# Patient Record
Sex: FEMALE | URBAN - METROPOLITAN AREA
[De-identification: names, ages, dates, MRNs, and addresses within clinical notes are randomized per-mention and may not be internally consistent; named-entity substitution may affect disease eponyms.]

---
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
## 2023-01-09 NOTE — PROCEDURE: MEDICATION COUNSELING
Mariam Dickens is here today for Shortness of Breath (Cough, congestion, Rattling sounds while breathing at night, x 1 month)    Concerns/symptoms: Refills on Inhalers  Medications: medications verified and updated  Refills needed today? Yes, medications not pended     Tobacco history: verified  Advanced Directives: Yes on file.  BP greater than 140/90? Yes    Patient would like communication of their results via:    Cell Phone:   Telephone Information:   Mobile [phone]     Okay to leave a message containing results? Yes  Preferred language:  Hmong.    Health Maintenance Due   Topic Date Due   • Hepatitis B Vaccine (For Physician/APC Discussion) (1 of 3 - 3-dose series) Never done   • Diabetes Eye Exam  Never done   • DTaP/Tdap/Td Vaccine (1 - Tdap) Never done   • Colorectal Cancer Screen-  Never done   • Shingles Vaccine (1 of 2) Never done   • COVID-19 Vaccine (4 - Booster for Moderna series) 05/12/2022   • Diabetes A1C  08/17/2022   • Influenza Vaccine (1) 09/01/2022   • Medicare Advantage- Medicare Wellness Visit  01/01/2023   • DM/CKD GFR  02/17/2023      Patient is due for the topics as listed above and wishes to proceed with them. Will discuss.    Medicare HRA:              PHQ 2:  Date Adult PHQ 2 Score Adult PHQ 2 Interpretation   1/9/2023 0 No further screening needed       PHQ 9:      Cyclosporine Counseling:  I discussed with the patient the risks of cyclosporine including but not limited to hypertension, gingival hyperplasia,myelosuppression, immunosuppression, liver damage, kidney damage, neurotoxicity, lymphoma, and serious infections. The patient understands that monitoring is required including baseline blood pressure, CBC, CMP, lipid panel and uric acid, and then 1-2 times monthly CMP and blood pressure.